# Patient Record
(demographics unavailable — no encounter records)

---

## 2025-04-18 NOTE — END OF VISIT
[Time Spent: ___ minutes] : I have spent [unfilled] minutes of time on the encounter which excludes teaching and separately reported services. Daughter

## 2025-04-18 NOTE — HISTORY OF PRESENT ILLNESS
[FreeTextEntry1] : Hannah Kelley is a 17 y/o female s/p a laparoscopic appendectomy on 4/2/2025 for a pathologic diagnosis of acute appendicitis and fibrinopurulent serositis.  Pt did well postop and is currently, eating and stooling normally with no pain, no fever, no diarrhea.  She is here for a postoperative visit.

## 2025-04-18 NOTE — REASON FOR VISIT
[Follow-up - Scheduled] : a follow-up, scheduled visit for [Father] : father [FreeTextEntry3] : acute appendicitis [FreeTextEntry4] : ELOY MARTNIEZ

## 2025-04-18 NOTE — ASSESSMENT
[FreeTextEntry1] : Overall, Hannah is a 15 y/o female s/p a laparoscopic appendectomy for acute appendicitis. There were no issues and the patient is back to her usual baseline.  Instructions were given in regard to wound care and exercise management.  She may return to the office as needed.

## 2025-04-18 NOTE — PHYSICAL EXAM
[Acute Distress] : no acute distress [Alert] : alert [Toxic appearing] : well appearing [NL] : soft, not tender, not distended [TextBox_37] : Soft, non-tender, non-distended, with positive bowel sounds.  There are no masses and no organomegaly. Laparoscopic wounds are clean, dry, and intact. No evidence for hernia nor infection in any of the trocar sites.

## 2025-04-18 NOTE — CONSULT LETTER
[Dear  ___] : Dear  [unfilled], [Please see my note below.] : Please see my note below. [FreeTextEntry1] : I had the pleasure of seeing LUIS GLENDY in my office on Apr 18, 2025 Thank you very much for letting me participate in LUIS PIKE 's care and I will keep you informed of her progress. Sincerely, Ben Hernandez M.D.